# Patient Record
(demographics unavailable — no encounter records)

---

## 2025-05-14 NOTE — RISK ASSESSMENT
[Clinical Records] : Clinical Records [Clinical Interview] : Clinical Interview [Collateral Sources] : Collateral Sources [No] : No [Supportive social network of family or friends] : supportive social network of family or friends [Positive therapeutic relationships] : positive therapeutic relationships [Responsibility to children, family, or others] : responsibility to children, family, or others [Engaged in work or school] : engaged in work or school

## 2025-05-14 NOTE — PLAN
[FreeTextEntry4] : PLAN: -psychoeducation about diagnosis and treatment modalities, alternatives to recommended treatment, risk Vs benefits of treatment and no treatment and alternative treatments. - Continue HRT therapy  -Lab/other tests: copy of [employer] tests -Medication: Start Prozac 10mg   -Safety:Educated patient of importance of remaining abstinent from drugs and alcohol; Emergency procedures were discussed. -Patient , parent given opportunity to ask questions and their questions were answered and they expressed understanding and agreement with above plan. -Follow up: RTC in 2-3 weeks for medication management or earlier as needed        I spent a total of 60 minutes for today's visit in evaluating and treating the patient as per above, including: preparing for patient's appointment (review of prior documents); obtaining and reviewing separately obtained history; performing a medically necessary exam/evaluation; independently interpreting results (labs/Health and Behavior Assessments); communicating/counseling/educating the patient/family/caregiver; ordering medications; referring to other health care professionals; documenting clinical information in the EHR

## 2025-05-14 NOTE — PLAN
[FreeTextEntry4] : PLAN: -psychoeducation about diagnosis and treatment modalities, alternatives to recommended treatment, risk Vs benefits of treatment and no treatment and alternative treatments. - Continue HRT therapy  -Lab/other tests: copy of [employer] tests -Medication: Start Prozac 10mg   -Safety:Educated patient of importance of remaining abstinent from drugs and alcohol; Emergency procedures were discussed. -Patient , parent given opportunity to ask questions and their questions were answered and they expressed understanding and agreement with above plan. -Follow up: RTC in 2-3 weeks for medication management or earlier as needed        I spent a total of 60 minutes for today's visit in evaluating and treating the patient as per above, including: preparing for patient's appointment (review of prior documents); obtaining and reviewing separately obtained history; performing a medically necessary exam/evaluation; independently interpreting results (labs/Health and Behavior Assessments); communicating/counseling/educating the patient/family/caregiver; ordering medications; referring to other health care professionals; documenting clinical information in the EHR

## 2025-05-14 NOTE — REASON FOR VISIT
[Self-Referred] : Self-Referred [Patient] : Patient [Prior Medical Records] : Prior Medical Records [Collateral - Name/Contact Info/Relationship:___] : Collateral: [unfilled] [FreeTextEntry2] : Trichotillomania med mgmt  [FreeTextEntry1] : Trichotillomania med mgmt

## 2025-05-14 NOTE — SOCIAL HISTORY
[FreeTextEntry1] : lives with mom , dad, older sister (18months older, attends college) mother onc physician, father   Describes close long term friendships, supportive family relationships Excels academically, graduating HS 2025, starting undergrad at [employer] fall 2025.   +trial experimentation of alcohol twice in high school.

## 2025-05-14 NOTE — HISTORY OF PRESENT ILLNESS
[FreeTextEntry1] : Patient is a 18 yo female, domiciled with bio parents older sister (college), graduating 12th grade regular education, starting college at [employer] in the fall; no prior psychiatric hospitalization, no self-injury or suicide attempts, no aggression/violence, no substance abuse, no legal issues, no CPS involvement, no trauma/abuse,  PMH (anemia resolved, s/p strabismus several eye surgeries early childhood), presenting today with mother who was referred by therapist for med mgmt.  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 Pt started in HRT and found significant improvement at first, with several interval flares returned for HRT refereshers; Pt was not keen on trying medication, desipite prozac receommendation in the past, pt felt the beahviors were controlled; In January had a return of BFRB and decided to trial lexapro with a psych NP; She discontinued because felt anxiety worsening despite only being on 5mg for few weeks; Then trial of naltrexone based on genesight results; however again felt no improvement, discontineud within few weeks.  Mother adds over the years several other supportive/supplemental/alternative therapies tried including MVI/mag/hypnosis with minimal improvement.  Mother is concerned as pt will be moving to [place] soon, the stressor may likely worsen the behaviors, therefore encouraged pt to reconsider prozac.  Pt was prescribed prozac by the NP, however has not started it yet.   Private interview with pt: corroborates above, adds she has excessive reassurance seeking compulsive behaviors "I'm indecisive!" ; able to laugh about this some, states friends point this out to her often ;at times friends or family can seem frustrated by her behaviors.   Agrees to YBOCS today: scores 19 (moderate) , primarily for  arrangement/checking/BFRB.   Pt denies MDD/kendall/psychsis/substance abuse/trauma/ED/ADHD sx. Pt denies SIIP/self harm. [FreeTextEntry2] : as per hpi  [FreeTextEntry3] :  as per hpi

## 2025-05-14 NOTE — SOCIAL HISTORY
[FreeTextEntry1] : lives with mom , dad, older sister (18months older, attends college) mother onc physician, father   Describes close long term friendships, supportive family relationships Excels academically, graduating HS 2025, starting undergrad at [employer] fall 2025.   +trial experimentation of alcohol twice in high school.

## 2025-05-14 NOTE — HISTORY OF PRESENT ILLNESS
[FreeTextEntry1] : Patient is a 18 yo female, domiciled with bio parents older sister (college), graduating 12th grade regular education, starting college at [employer] in the fall; no prior psychiatric hospitalization, no self-injury or suicide attempts, no aggression/violence, no substance abuse, no legal issues, no CPS involvement, no trauma/abuse,  PMH (anemia resolved, s/p strabismus several eye surgeries early childhood), presenting today with mother who was referred by therapist for med mgmt.  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 Pt started in HRT and found significant improvement at first, with several interval flares returned for HRT refereshers; Pt was not keen on trying medication, desipite prozac receommendation in the past, pt felt the beahviors were controlled; In January had a return of BFRB and decided to trial lexapro with a psych NP; She discontinued because felt anxiety worsening despite only being on 5mg for few weeks; Then trial of naltrexone based on genesight results; however again felt no improvement, discontineud within few weeks.  Mother adds over the years several other supportive/supplemental/alternative therapies tried including MVI/mag/hypnosis with minimal improvement.  Mother is concerned as pt will be moving to [place] soon, the stressor may likely worsen the behaviors, therefore encouraged pt to reconsider prozac.  Pt was prescribed prozac by the NP, however has not started it yet.   Private interview with pt: corroborates above, adds she has excessive reassurance seeking compulsive behaviors "I'm indecisive!" ; able to laugh about this some, states friends point this out to her often ;at times friends or family can seem frustrated by her behaviors.   Agrees to YBOCS today: scores 19 (moderate) , primarily for  arrangement/checking/BFRB.   Pt denies MDD/kendall/psychsis/substance abuse/trauma/ED/ADHD sx. Pt denies SIIP/self harm. [FreeTextEntry2] : as per hpi  [FreeTextEntry3] :  as per hpi

## 2025-05-14 NOTE — PHYSICAL EXAM
[Average] : average [Cooperative] : cooperative [Anxious] : anxious [Full] : full [Clear] : clear [Soft] : soft [Linear/Goal Directed] : linear/goal directed [None] : none [Preoccupations/Ruminations] : preoccupations/ruminations [Obsessional] : obsessional [None Reported] : none reported [Above average] : above average [WNL] : within normal limits [Positive interaction] : positive interaction [Unremarkable/age appropriate] : unremarkable/age appropriate

## 2025-05-14 NOTE — PAST MEDICAL HISTORY
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

## 2025-05-14 NOTE — DISCUSSION/SUMMARY
[FreeTextEntry1] : 18 yo F with Trichotillomania, OCD; Protective factors of supportive family and friends, looks to treatment favorably, as such with treatment adherence, prognosis is good.  [Low acute suicide risk] : Low acute suicide risk [No] : No [Not clinically indicated] : Safety Plan completed/updated (for individuals at risk): Not clinically indicated

## 2025-05-28 NOTE — DISCUSSION/SUMMARY
[FreeTextEntry1] :  18 yo F with Trichotillomania, OCD; Protective factors of supportive family and friends, looks to treatment favorably, as such with treatment adherence, prognosis is good.

## 2025-05-28 NOTE — HISTORY OF PRESENT ILLNESS
[FreeTextEntry1] : Patient is a 16 yo female, domiciled with bio parents older sister (college), graduating 12th grade regular education, starting college at [employer] in the fall; no prior psychiatric hospitalization, no self-injury or suicide attempts, no aggression/violence, no substance abuse, no legal issues, no CPS involvement, no trauma/abuse,  PMH (anemia resolved, s/p strabismus several eye surgeries early childhood), presenting today with mother who was referred by therapist for med mgmt.  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 Pt started in HRT and found significant improvement at first, with several interval flares returned for HRT refereshers; Pt was not keen on trying medication, desipite prozac receommendation in the past, pt felt the beahviors were controlled; In January had a return of BFRB and decided to trial lexapro with a psych NP; She discontinued because felt anxiety worsening despite only being on 5mg for few weeks; Then trial of naltrexone based on genesight results; however again felt no improvement, discontineud within few weeks.  Mother adds over the years several other supportive/supplemental/alternative therapies tried including MVI/mag/hypnosis with minimal improvement.  Mother is concerned as pt will be moving to [place] soon, the stressor may likely worsen the behaviors, therefore encouraged pt to reconsider prozac.  Pt was prescribed prozac by the NP, however has not started it yet.   Private interview with pt: corroborates above, adds she has excessive reassurance seeking compulsive behaviors "I'm indecisive!" ; able to laugh about this some, states friends point this out to her often ;at times friends or family can seem frustrated by her behaviors.   Agrees to YBOCS today: scores 19 (moderate) , primarily for  arrangement/checking/BFRB.   Pt denies MDD/kendall/psychsis/substance abuse/trauma/ED/ADHD sx. Pt denies SIIP/self harm. [FreeTextEntry2] : as per hpi  [FreeTextEntry3] :  as per hpi

## 2025-05-28 NOTE — SOCIAL HISTORY
[FreeTextEntry1] :  lives with mom , dad, older sister (18months older, attends college) mother onc physician, father  Describes close long term friendships, supportive family relationships Excels academically, graduating HS 2025, starting undergrad at [employer] fall 2025.  +trial experimentation of alcohol twice in high school.

## 2025-05-28 NOTE — PLAN
[No] : No [Medication education provided] : Medication education provided. [Rationale for medication choices, possible risks/precautions, benefits, alternative treatment choices, and consequences of non-treatment discussed] : Rationale for medication choices, possible risks/precautions, benefits, alternative treatment choices, and consequences of non-treatment discussed with patient/family/caregiver  [FreeTextEntry5] : PLAN: -psychoeducation about diagnosis and treatment modalities  - Continue HRT therapy, pt also trialing hypnosis -Lab/other tests: copy of [employer] tests -Medication:  Prozac 10mg, likely titration to 20mg next visit discussed.   -Patient , parent given opportunity to ask questions and their questions were answered and they expressed understanding and agreement with above plan. -Follow up: RTC in 4  weeks for medication management or earlier as needed

## 2025-06-25 NOTE — DISCUSSION/SUMMARY
[FreeTextEntry1] : 18 yo F with Trichotillomania, OCD; Protective factors of supportive family and friends, looks to treatment favorably, as such with treatment adherence, prognosis is good. May 2025: start prozac 10mg June 13th, 2025: inc prozac 20mg

## 2025-06-25 NOTE — HISTORY OF PRESENT ILLNESS
[FreeTextEntry1] : Patient is a 18 yo female, domiciled with bio parents older sister (college), graduating 12th grade regular education, starting college at [employer] in the fall; no prior psychiatric hospitalization, no self-injury or suicide attempts, no aggression/violence, no substance abuse, no legal issues, no CPS involvement, no trauma/abuse,  PMH (anemia resolved, s/p strabismus several eye surgeries early childhood), presenting today with mother who was referred by therapist for med mgmt.  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 Pt started in HRT and found significant improvement at first, with several interval flares returned for HRT refereshers; Pt was not keen on trying medication, desipite prozac receommendation in the past, pt felt the beahviors were controlled; In January had a return of BFRB and decided to trial lexapro with a psych NP; She discontinued because felt anxiety worsening despite only being on 5mg for few weeks; Then trial of naltrexone based on genesight results; however again felt no improvement, discontineud within few weeks.  Mother adds over the years several other supportive/supplemental/alternative therapies tried including MVI/mag/hypnosis with minimal improvement.  Mother is concerned as pt will be moving to [place] soon, the stressor may likely worsen the behaviors, therefore encouraged pt to reconsider prozac.  Pt was prescribed prozac by the NP, however has not started it yet.   Private interview with pt: corroborates above, adds she has excessive reassurance seeking compulsive behaviors "I'm indecisive!" ; able to laugh about this some, states friends point this out to her often ;at times friends or family can seem frustrated by her behaviors.   Agrees to YBOCS today: scores 19 (moderate) , primarily for  arrangement/checking/BFRB.   Pt denies MDD/kendall/psychsis/substance abuse/trauma/ED/ADHD sx. Pt denies SIIP/self harm. [FreeTextEntry2] : as per hpi  [FreeTextEntry3] :  as per hpi

## 2025-06-25 NOTE — SOCIAL HISTORY
[FreeTextEntry1] : lives with mom , dad, older sister (18months older, attends college) mother onc physician, father  Describes close long term friendships, supportive family relationships Excels academically, graduating HS 2025, starting undergrad at [employer] fall 2025.  +trial experimentation of alcohol twice in high school.

## 2025-06-25 NOTE — PLAN
[No] : No [Medication education provided] : Medication education provided. [Rationale for medication choices, possible risks/precautions, benefits, alternative treatment choices, and consequences of non-treatment discussed] : Rationale for medication choices, possible risks/precautions, benefits, alternative treatment choices, and consequences of non-treatment discussed with patient/family/caregiver  [FreeTextEntry5] : -psychoeducation about diagnosis and treatment modalities  - Continue HRT therapy -Lab/other tests: copy of [employer] tests -Medication: inc Prozac 20mg june 13th  -Safety:Educated patient of importance of remaining abstinent from drugs and alcohol; Emergency procedures were discussed. -Patient , parent given opportunity to ask questions and their questions were answered and they expressed understanding and agreement with above plan. -Follow up: RTC in 6-8  weeks for medication management or earlier as needed